# Patient Record
Sex: MALE | Race: BLACK OR AFRICAN AMERICAN | NOT HISPANIC OR LATINO | URBAN - METROPOLITAN AREA
[De-identification: names, ages, dates, MRNs, and addresses within clinical notes are randomized per-mention and may not be internally consistent; named-entity substitution may affect disease eponyms.]

---

## 2022-08-02 ENCOUNTER — CLAIMS CREATED FROM THE CLAIM WINDOW (OUTPATIENT)
Dept: URBAN - METROPOLITAN AREA CLINIC 44 | Facility: CLINIC | Age: 52
End: 2022-08-02
Payer: COMMERCIAL

## 2022-08-02 ENCOUNTER — LAB OUTSIDE AN ENCOUNTER (OUTPATIENT)
Dept: URBAN - METROPOLITAN AREA CLINIC 44 | Facility: CLINIC | Age: 52
End: 2022-08-02

## 2022-08-02 VITALS
WEIGHT: 257 LBS | BODY MASS INDEX: 41.3 KG/M2 | DIASTOLIC BLOOD PRESSURE: 104 MMHG | SYSTOLIC BLOOD PRESSURE: 151 MMHG | TEMPERATURE: 97.3 F | HEIGHT: 66 IN | HEART RATE: 82 BPM

## 2022-08-02 DIAGNOSIS — K66.0 ABDOMINAL ADHESIONS: ICD-10-CM

## 2022-08-02 DIAGNOSIS — R10.33 PERIUMBILICAL ABDOMINAL PAIN: ICD-10-CM

## 2022-08-02 PROCEDURE — 99203 OFFICE O/P NEW LOW 30 MIN: CPT | Performed by: INTERNAL MEDICINE

## 2022-08-02 RX ORDER — CARVEDILOL 25 MG/1
1 TABLET WITH FOOD TABLET, FILM COATED ORAL TWICE A DAY
Status: ACTIVE | COMMUNITY

## 2022-08-02 RX ORDER — DICYCLOMINE HYDROCHLORIDE 20 MG/1
1 TABLET TABLET ORAL THREE TIMES A DAY
Status: ACTIVE | COMMUNITY

## 2022-08-02 RX ORDER — DICYCLOMINE HYDROCHLORIDE 20 MG/1
1 TABLET TABLET ORAL
Qty: 90 | Refills: 3 | OUTPATIENT

## 2022-08-02 RX ORDER — FAMOTIDINE 20 MG/1
1 TABLET AT BEDTIME AS NEEDED TABLET, FILM COATED ORAL ONCE A DAY
Status: ACTIVE | COMMUNITY

## 2022-08-02 RX ORDER — NIFEDIPINE 60 MG/1
1 TABLET ON AN EMPTY STOMACH TABLET, EXTENDED RELEASE ORAL ONCE A DAY
Status: ACTIVE | COMMUNITY

## 2022-08-02 RX ORDER — FLUTICASONE PROPIONATE 50 UG/1
1 SPRAY IN EACH NOSTRIL SPRAY, METERED NASAL ONCE A DAY
Status: ACTIVE | COMMUNITY

## 2022-08-02 RX ORDER — VALSARTAN 160 MG/1
1 TABLET TABLET, FILM COATED ORAL ONCE A DAY
Status: ACTIVE | COMMUNITY

## 2022-08-02 NOTE — HPI-TODAY'S VISIT:
51 year old male new to our practice. He has a history of diverticulitis. He had a subtotal colectomy in 2003. He has had several abdominal hernias with small bowel obstructions. He has had part of his small intestine removed as a result. He had another surgery for an abdominal hematoma. He has had 6 surgeries since the diverticulitis. His last surgery was November 2021 for a hernia. He saw a GI Physician in [place] around 2007. He had an EGD around 2011. He thinks it was normal.   CT 7/11/2022 enlarged prostate encroaching on bladder. CT 4/27/2021 enlarged prostate and extensive past bowel surgery. He is seeing a urologist. His insurance "just kicked in". So he is seeing all new Physicians.  CMP and CBC were unremarkable 7/11/2022. He is having constant pain in his abdomen near the sight of the last hernia repair. A heating pad helps. He got pain medication in the ER that helped. Pain is every day. It lasts a few minutes to a couple of hours. Can have pain up to 2 or 3 times a day. Heating pad helps. Pain meds help. He is allergic to Toradol and Lortab. Hot shower helps. Sitting still helps. Movement makes it worst. Food does not change it. Having a bowel movement does not change it. No fevers, chills. Occasional nausea, but no emesis or dysphagia. No chest pain or shortness of breath.   He has had pain similar to this since the hematoma and infected patch removal surgery that was done in 2005. He has been on Gabapentin in the past. It initially helped, but stopped working.

## 2022-08-08 ENCOUNTER — TELEPHONE ENCOUNTER (OUTPATIENT)
Dept: URBAN - METROPOLITAN AREA CLINIC 46 | Facility: CLINIC | Age: 52
End: 2022-08-08

## 2022-09-08 ENCOUNTER — DASHBOARD ENCOUNTERS (OUTPATIENT)
Age: 52
End: 2022-09-08

## 2022-09-13 ENCOUNTER — OFFICE VISIT (OUTPATIENT)
Dept: URBAN - METROPOLITAN AREA CLINIC 44 | Facility: CLINIC | Age: 52
End: 2022-09-13
Payer: COMMERCIAL

## 2022-09-13 VITALS
HEIGHT: 66 IN | WEIGHT: 259.2 LBS | SYSTOLIC BLOOD PRESSURE: 139 MMHG | TEMPERATURE: 97.3 F | BODY MASS INDEX: 41.66 KG/M2 | DIASTOLIC BLOOD PRESSURE: 99 MMHG | HEART RATE: 83 BPM

## 2022-09-13 DIAGNOSIS — K66.0 ABDOMINAL ADHESIONS: ICD-10-CM

## 2022-09-13 DIAGNOSIS — R10.33 PERIUMBILICAL ABDOMINAL PAIN: ICD-10-CM

## 2022-09-13 PROBLEM — 70190001 PERITONEAL ADHESION: Status: ACTIVE | Noted: 2022-08-10

## 2022-09-13 PROBLEM — 443503005 PERIUMBILICAL ABDOMINAL PAIN: Status: ACTIVE | Noted: 2022-08-10

## 2022-09-13 PROCEDURE — 99213 OFFICE O/P EST LOW 20 MIN: CPT | Performed by: INTERNAL MEDICINE

## 2022-09-13 RX ORDER — FAMOTIDINE 20 MG/1
1 TABLET AT BEDTIME AS NEEDED TABLET, FILM COATED ORAL ONCE A DAY
Status: ACTIVE | COMMUNITY

## 2022-09-13 RX ORDER — CARVEDILOL 25 MG/1
1 TABLET WITH FOOD TABLET, FILM COATED ORAL TWICE A DAY
Status: ACTIVE | COMMUNITY

## 2022-09-13 RX ORDER — NIFEDIPINE 60 MG/1
1 TABLET ON AN EMPTY STOMACH TABLET, EXTENDED RELEASE ORAL ONCE A DAY
Status: ACTIVE | COMMUNITY

## 2022-09-13 RX ORDER — DICYCLOMINE HYDROCHLORIDE 20 MG/1
1 TABLET TABLET ORAL
Qty: 90 | Refills: 3 | Status: ACTIVE | COMMUNITY

## 2022-09-13 RX ORDER — FLUTICASONE PROPIONATE 50 UG/1
1 SPRAY IN EACH NOSTRIL SPRAY, METERED NASAL ONCE A DAY
Status: ACTIVE | COMMUNITY

## 2022-09-13 RX ORDER — HYOSCYAMINE SULFATE 0.38 MG/1
1 TABLET TABLET, EXTENDED RELEASE ORAL
Qty: 60 | Refills: 3 | OUTPATIENT
Start: 2022-09-13 | End: 2023-01-10

## 2022-09-13 NOTE — HPI-TODAY'S VISIT:
51 year old male with a history of diverticulitis. He had a subtotal colectomy in 2003. He has had several abdominal hernias with small bowel obstructions. He has had part of his small intestine removed as a result. He had another surgery for an abdominal hematoma. He has had 6 surgeries since the diverticulitis. His last surgery was November 2021 for a hernia. He saw a GI Physician in [place] around 2007. He had an EGD around 2011. He thinks it was normal. UGI small bowel series 8/17/2022 showed postsurgical changes. No obstruction was seen. He is scheduled for pain clinic in 2 weeks. His last EGD and colon were done at [facility] last year. He does feel Bentyl helps. He is taking this only 2 to 3 times a week. He is so used to the pain he does not want to take them all the time. He feels he has a high tolerance to pain. No nausea, vomiting or dysphagia.   CT 7/11/2022 enlarged prostate encroaching on bladder. CT 4/27/2021 enlarged prostate and extensive past bowel surgery. He is seeing a urologist next month. Has echo scheduled later today. CMP and CBC were unremarkable 7/11/2022. He is having constant pain in his abdomen near the sight of the last hernia repair. A heating pad helps. He got pain medication in the ER that helped. Pain is every day. It lasts a few minutes to a couple of hours. Can have pain up to 2 or 3 times a day. Pain comes and goes. Heating pad helps. He is allergic to Toradol and Lortab. Hot shower helps. Sitting still helps. Movement makes it worst. Food does not change it. Having a bowel movement does not change it.  He has had pain similar to this since the hematoma and infected patch removal surgery that was done in 2005. He has been on Gabapentin in the past. It initially helped, but stopped working.

## 2022-09-17 ENCOUNTER — WEB ENCOUNTER (OUTPATIENT)
Dept: URBAN - METROPOLITAN AREA CLINIC 44 | Facility: CLINIC | Age: 52
End: 2022-09-17

## 2022-10-03 ENCOUNTER — WEB ENCOUNTER (OUTPATIENT)
Dept: URBAN - METROPOLITAN AREA CLINIC 46 | Facility: CLINIC | Age: 52
End: 2022-10-03

## 2022-10-04 ENCOUNTER — WEB ENCOUNTER (OUTPATIENT)
Dept: URBAN - METROPOLITAN AREA CLINIC 44 | Facility: CLINIC | Age: 52
End: 2022-10-04

## 2022-10-13 ENCOUNTER — WEB ENCOUNTER (OUTPATIENT)
Dept: URBAN - METROPOLITAN AREA CLINIC 44 | Facility: CLINIC | Age: 52
End: 2022-10-13

## 2022-10-25 ENCOUNTER — OFFICE VISIT (OUTPATIENT)
Dept: URBAN - METROPOLITAN AREA CLINIC 44 | Facility: CLINIC | Age: 52
End: 2022-10-25